# Patient Record
Sex: MALE | Race: WHITE | NOT HISPANIC OR LATINO | Employment: OTHER | URBAN - METROPOLITAN AREA
[De-identification: names, ages, dates, MRNs, and addresses within clinical notes are randomized per-mention and may not be internally consistent; named-entity substitution may affect disease eponyms.]

---

## 2020-07-27 ENCOUNTER — APPOINTMENT (OUTPATIENT)
Dept: RADIOLOGY | Facility: MEDICAL CENTER | Age: 85
End: 2020-07-27
Attending: EMERGENCY MEDICINE
Payer: COMMERCIAL

## 2020-07-27 ENCOUNTER — HOSPITAL ENCOUNTER (EMERGENCY)
Facility: MEDICAL CENTER | Age: 85
End: 2020-07-27
Attending: EMERGENCY MEDICINE
Payer: COMMERCIAL

## 2020-07-27 VITALS
RESPIRATION RATE: 19 BRPM | HEART RATE: 75 BPM | OXYGEN SATURATION: 94 % | SYSTOLIC BLOOD PRESSURE: 155 MMHG | WEIGHT: 141.09 LBS | BODY MASS INDEX: 21.38 KG/M2 | TEMPERATURE: 98.9 F | DIASTOLIC BLOOD PRESSURE: 72 MMHG | HEIGHT: 68 IN

## 2020-07-27 DIAGNOSIS — K08.9 POOR DENTITION: ICD-10-CM

## 2020-07-27 LAB
ANION GAP SERPL CALC-SCNC: 10 MMOL/L (ref 7–16)
BASOPHILS # BLD AUTO: 0.3 % (ref 0–1.8)
BASOPHILS # BLD: 0.03 K/UL (ref 0–0.12)
BUN SERPL-MCNC: 17 MG/DL (ref 8–22)
CALCIUM SERPL-MCNC: 9.1 MG/DL (ref 8.5–10.5)
CHLORIDE SERPL-SCNC: 100 MMOL/L (ref 96–112)
CO2 SERPL-SCNC: 27 MMOL/L (ref 20–33)
CREAT SERPL-MCNC: 0.65 MG/DL (ref 0.5–1.4)
EOSINOPHIL # BLD AUTO: 0.39 K/UL (ref 0–0.51)
EOSINOPHIL NFR BLD: 3.8 % (ref 0–6.9)
ERYTHROCYTE [DISTWIDTH] IN BLOOD BY AUTOMATED COUNT: 51.7 FL (ref 35.9–50)
GLUCOSE SERPL-MCNC: 80 MG/DL (ref 65–99)
HCT VFR BLD AUTO: 36.4 % (ref 42–52)
HGB BLD-MCNC: 11.9 G/DL (ref 14–18)
IMM GRANULOCYTES # BLD AUTO: 0.04 K/UL (ref 0–0.11)
IMM GRANULOCYTES NFR BLD AUTO: 0.4 % (ref 0–0.9)
LYMPHOCYTES # BLD AUTO: 1.9 K/UL (ref 1–4.8)
LYMPHOCYTES NFR BLD: 18.5 % (ref 22–41)
MCH RBC QN AUTO: 32.2 PG (ref 27–33)
MCHC RBC AUTO-ENTMCNC: 32.7 G/DL (ref 33.7–35.3)
MCV RBC AUTO: 98.4 FL (ref 81.4–97.8)
MONOCYTES # BLD AUTO: 0.91 K/UL (ref 0–0.85)
MONOCYTES NFR BLD AUTO: 8.9 % (ref 0–13.4)
NEUTROPHILS # BLD AUTO: 7 K/UL (ref 1.82–7.42)
NEUTROPHILS NFR BLD: 68.1 % (ref 44–72)
NRBC # BLD AUTO: 0 K/UL
NRBC BLD-RTO: 0 /100 WBC
PLATELET # BLD AUTO: 223 K/UL (ref 164–446)
PMV BLD AUTO: 10.2 FL (ref 9–12.9)
POTASSIUM SERPL-SCNC: 4.6 MMOL/L (ref 3.6–5.5)
RBC # BLD AUTO: 3.7 M/UL (ref 4.7–6.1)
SODIUM SERPL-SCNC: 137 MMOL/L (ref 135–145)
WBC # BLD AUTO: 10.3 K/UL (ref 4.8–10.8)

## 2020-07-27 PROCEDURE — 99284 EMERGENCY DEPT VISIT MOD MDM: CPT

## 2020-07-27 PROCEDURE — 80048 BASIC METABOLIC PNL TOTAL CA: CPT

## 2020-07-27 PROCEDURE — 700117 HCHG RX CONTRAST REV CODE 255: Performed by: EMERGENCY MEDICINE

## 2020-07-27 PROCEDURE — 85025 COMPLETE CBC W/AUTO DIFF WBC: CPT

## 2020-07-27 PROCEDURE — 70487 CT MAXILLOFACIAL W/DYE: CPT

## 2020-07-27 RX ADMIN — IOHEXOL 80 ML: 350 INJECTION, SOLUTION INTRAVENOUS at 15:42

## 2020-07-27 NOTE — ED TRIAGE NOTES
Chief Complaint   Patient presents with   • Mouth Pain     pain and discharge from l upper jaw, hx fixation     Pt saw dentist and was informed he has large infections in his upper maxilla

## 2020-07-27 NOTE — ED PROVIDER NOTES
ED Provider Note    CHIEF COMPLAINT  Chief Complaint   Patient presents with   • Mouth Pain     pain and discharge from l upper jaw, hx fixation       HPI  Jon Christianson is a 84 y.o. male who presents with chief complaint of mouth pain.  Patient has abscess of his left upper jaw, he was seen by his dentist today who sent him here for oral surgery.  Patient denies any associated pain.  Patient has a longstanding history of poor dentition, he has not brushed his teeth for the last decade.  He has not had any obvious facial swelling or difficulty managing his secretions.  He has a longstanding history of Parkinson's disease.  His family took him to the dentist today.    REVIEW OF SYSTEMS  ROS    See HPI for further details. All other systems are negative.     PAST MEDICAL HISTORY   has a past medical history of CAD (coronary artery disease), Cancer (HCC), Diabetes (HCC), Hypertension, and Parkinson disease (HCC).    SOCIAL HISTORY  Social History     Tobacco Use   • Smoking status: Former Smoker   • Smokeless tobacco: Never Used   Substance and Sexual Activity   • Alcohol use: Not Currently   • Drug use: Never   • Sexual activity: Not on file       SURGICAL HISTORY  patient denies any surgical history    CURRENT MEDICATIONS  Home Medications    **Home medications have not yet been reviewed for this encounter**         ALLERGIES  Allergies   Allergen Reactions   • Pcn [Penicillins]        PHYSICAL EXAM  Physical Exam   Constitutional: He is oriented to person, place, and time. He appears well-developed and well-nourished.   HENT:   Significant poor dentition, there is no obvious focal area of fluctuance or asymmetry of the oropharynx, no trismus, no facial swelling, no floor of mouth swelling or submandibular fullness, no stridor. No pharyngeal asymmetry. Nl phonation     Eyes: Conjunctivae are normal.   Neck: Normal range of motion. Neck supple.   Pulmonary/Chest: Effort normal.   Neurological: He is alert and oriented  to person, place, and time.   Skin: Skin is warm.   Psychiatric: He has a normal mood and affect. His behavior is normal.         DIAGNOSTIC STUDIES / PROCEDURES      LABS  Results for orders placed or performed during the hospital encounter of 07/27/20   CBC WITH DIFFERENTIAL   Result Value Ref Range    WBC 10.3 4.8 - 10.8 K/uL    RBC 3.70 (L) 4.70 - 6.10 M/uL    Hemoglobin 11.9 (L) 14.0 - 18.0 g/dL    Hematocrit 36.4 (L) 42.0 - 52.0 %    MCV 98.4 (H) 81.4 - 97.8 fL    MCH 32.2 27.0 - 33.0 pg    MCHC 32.7 (L) 33.7 - 35.3 g/dL    RDW 51.7 (H) 35.9 - 50.0 fL    Platelet Count 223 164 - 446 K/uL    MPV 10.2 9.0 - 12.9 fL    Neutrophils-Polys 68.10 44.00 - 72.00 %    Lymphocytes 18.50 (L) 22.00 - 41.00 %    Monocytes 8.90 0.00 - 13.40 %    Eosinophils 3.80 0.00 - 6.90 %    Basophils 0.30 0.00 - 1.80 %    Immature Granulocytes 0.40 0.00 - 0.90 %    Nucleated RBC 0.00 /100 WBC    Neutrophils (Absolute) 7.00 1.82 - 7.42 K/uL    Lymphs (Absolute) 1.90 1.00 - 4.80 K/uL    Monos (Absolute) 0.91 (H) 0.00 - 0.85 K/uL    Eos (Absolute) 0.39 0.00 - 0.51 K/uL    Baso (Absolute) 0.03 0.00 - 0.12 K/uL    Immature Granulocytes (abs) 0.04 0.00 - 0.11 K/uL    NRBC (Absolute) 0.00 K/uL   Basic Metabolic Panel   Result Value Ref Range    Sodium 137 135 - 145 mmol/L    Potassium 4.6 3.6 - 5.5 mmol/L    Chloride 100 96 - 112 mmol/L    Co2 27 20 - 33 mmol/L    Glucose 80 65 - 99 mg/dL    Bun 17 8 - 22 mg/dL    Creatinine 0.65 0.50 - 1.40 mg/dL    Calcium 9.1 8.5 - 10.5 mg/dL    Anion Gap 10.0 7.0 - 16.0   ESTIMATED GFR   Result Value Ref Range    GFR If African American >60 >60 mL/min/1.73 m 2    GFR If Non African American >60 >60 mL/min/1.73 m 2         RADIOLOGY  CT-MAXILLOFACIAL WITH PLUS RECONS   Final Result         1. Absent maxillary teeth. Prior left maxillary ORIF. No evidence of subperiosteal abscess. No cortical destruction to suggest osteomyelitis.   2. Poor mandibular dentition, with multiple cavities and multiple no  periapical lucencies in the remaining teeth.          COURSE & MEDICAL DECISION MAKING  Pertinent Labs & Imaging studies reviewed. (See chart for details)    Patient here sent from his dentist office to be evaluated for possible oral abscesses.  Clinically I do not see any but given the reason for transfer will CT.  Patient CT is unremarkable.  His basic labs are unremarkable.  Patient is not in any airway issues.  I discussed the case with radiology to ensure there is no evidence of fluid collection or infection otherwise, I have also discussed the case with our oral surgeon who has evaluated the images as well and agrees that there is no evidence of any infection or fluid collection.  He will follow-up with patient as an outpatient for possible removal of his hardware which was placed decades ago.  I have discussed these findings with family and patient, he will follow-up with Dr. Bell.     The patient will return for worsening symptoms and is stable at the time of discharge. The patient verbalizes understanding and will comply.    FINAL IMPRESSION    1. Poor dentition               Electronically signed by: Obie Montenegro M.D., 7/27/2020 1:56 PM
